# Patient Record
(demographics unavailable — no encounter records)

---

## 2019-10-22 NOTE — DIAGNOSTIC IMAGING REPORT
EXAMINATION:  CHEST 2 VIEWS    



INDICATION: Short of breath     



COMPARISON:  None

     

FINDINGS:  PA and lateral views



TUBES and LINES:  None.



LUNGS:  Lungs are well inflated.  Lungs are clear.  There is no evidence of

pneumonia or pulmonary edema.



PLEURA:  No pleural effusion or pneumothorax.



HEART AND MEDIASTINUM:  The cardiomediastinal silhouette is small.    



BONES AND SOFT TISSUES:  No acute osseous lesion.  Soft tissues are

unremarkable.



UPPER ABDOMEN: No free air under the diaphragm.    



IMPRESSION: 



No acute thoracic radiographic abnormality.

Small cardiomediastinal silhouette can be due to hypovolemia.





Signed by: Warner Durán DO on 10/22/2019 8:32 AM

## 2019-10-22 NOTE — XMS REPORT
Patient Summary Document

                             Created on: 10/22/2019



SHAFER JUANIS

External Reference #: [id number]

: 2005

Sex: Male



Demographics







                          Address                   [address] [place] [address]  [id number]

 

                          Home Phone                [phone]

 

                          Preferred Language        Unknown

 

                          Marital Status            Unknown

 

                          Restorationism Affiliation     Unknown

 

                          Race                      Unknown

 

                          Ethnic Group              Unknown





Author







                          Author                    [facility]

 

                          [facility]

 

                          Address                   Unknown

 

                          Phone                     Unavailable







Care Team Providers







                    Care Team Member Name    Role                Phone

 

                          Unavailable               Unavailable







Problems

This patient has no known problems.



Allergies, Adverse Reactions, Alerts

This patient has no known allergies or adverse reactions.



Medications

This patient has no known medications.



Encounters







             Start Date/Time    End Date/Time    Encounter Type    Admission Type    Attending [facility]       Care Department     Encounter ID

 

        2018 00:00:00    2018 00:00:00    Outpatient                    [facility]     [id number]

 

        2018 00:00:00    2018 00:00:00    Outpatient                    [facility]     [id number]

 

        2018 00:00:00    2018 00:00:00    Outpatient                    [facility]     [id number]

 

        2018 00:00:00    2018 00:00:00    Outpatient                    [facility]     [id number]

 

        2018 00:00:00    2018 00:00:00    Outpatient                    [facility]     [id number]

 

        2018-10-04 00:00:00    2018-10-04 00:00:00    Outpatient                    [facility]     [id number]

 

        2018-10-03 00:00:00    2018-10-03 00:00:00    Outpatient                    [facility]     [id number]

 

        2018-10-01 15:26:57    2018-10-01 15:26:57    Outpatient                    [facility]     [id number]

 

        2018 00:00:00    2018 00:00:00    Outpatient                    [facility]     [id number]

 

        2018-09-10 10:23:36    2018-09-10 10:23:36    Outpatient                    [facility]     [id number]

 

        2018 08:02:06    2018 08:02:06    Outpatient                    [facility]     [id number]

 

        2018 13:25:05    2018 13:25:05    Outpatient                    [facility]     [id number]

 

        2018 00:00:00    2018 00:00:00    Outpatient                    [facility]     [id number]

 

        2018 15:39:04    2018 15:39:04    Outpatient                    [facility]     [id number]

 

        2018 00:00:00    2018 00:00:00    Outpatient                    [facility]     [id number]

 

        2018 11:00:45    2018 11:00:45    Outpatient                    [facility]     [id number]

 

        2018 11:20:24    2018 11:20:24    Outpatient                    [facility]     [id number]

 

        2018 08:04:20    2018 08:04:20    Outpatient                    [facility]     [id number]